# Patient Record
Sex: FEMALE | Race: OTHER | URBAN - METROPOLITAN AREA
[De-identification: names, ages, dates, MRNs, and addresses within clinical notes are randomized per-mention and may not be internally consistent; named-entity substitution may affect disease eponyms.]

---

## 2020-07-19 ENCOUNTER — EMERGENCY (EMERGENCY)
Facility: HOSPITAL | Age: 25
LOS: 1 days | Discharge: HOME | End: 2020-07-19
Admitting: EMERGENCY MEDICINE
Payer: COMMERCIAL

## 2020-07-19 VITALS
SYSTOLIC BLOOD PRESSURE: 148 MMHG | WEIGHT: 125 LBS | DIASTOLIC BLOOD PRESSURE: 89 MMHG | HEART RATE: 104 BPM | TEMPERATURE: 98 F | OXYGEN SATURATION: 98 % | HEIGHT: 65 IN | RESPIRATION RATE: 16 BRPM

## 2020-07-19 VITALS
DIASTOLIC BLOOD PRESSURE: 78 MMHG | OXYGEN SATURATION: 98 % | TEMPERATURE: 98 F | RESPIRATION RATE: 16 BRPM | HEART RATE: 87 BPM | SYSTOLIC BLOOD PRESSURE: 134 MMHG

## 2020-07-19 PROCEDURE — 99283 EMERGENCY DEPT VISIT LOW MDM: CPT | Mod: 25

## 2020-07-19 PROCEDURE — 69200 CLEAR OUTER EAR CANAL: CPT

## 2020-07-19 RX ORDER — IBUPROFEN 200 MG
1 TABLET ORAL
Qty: 20 | Refills: 0
Start: 2020-07-19

## 2020-07-19 RX ORDER — BACITRACIN ZINC 500 UNIT/G
1 OINTMENT IN PACKET (EA) TOPICAL ONCE
Refills: 0 | Status: COMPLETED | OUTPATIENT
Start: 2020-07-19 | End: 2020-07-19

## 2020-07-19 RX ORDER — OXYCODONE AND ACETAMINOPHEN 5; 325 MG/1; MG/1
1 TABLET ORAL ONCE
Refills: 0 | Status: DISCONTINUED | OUTPATIENT
Start: 2020-07-19 | End: 2020-07-19

## 2020-07-19 RX ORDER — CIPROFLOXACIN LACTATE 400MG/40ML
500 VIAL (ML) INTRAVENOUS ONCE
Refills: 0 | Status: COMPLETED | OUTPATIENT
Start: 2020-07-19 | End: 2020-07-19

## 2020-07-19 RX ADMIN — OXYCODONE AND ACETAMINOPHEN 1 TABLET(S): 5; 325 TABLET ORAL at 02:43

## 2020-07-19 RX ADMIN — Medication 1 APPLICATION(S): at 03:30

## 2020-07-19 RX ADMIN — Medication 500 MILLIGRAM(S): at 03:19

## 2020-07-19 NOTE — ED PROCEDURE NOTE - CPROC ED SCALPEL SIZE1
piercing removed with sterile desirae clamps and subsequently milked and irrigated with purulent dc noted, no retained FB noted,

## 2020-07-19 NOTE — ED ADULT NURSE NOTE - CHPI ED NUR SYMPTOMS NEG
no tingling/no decreased eating/drinking/no chills/no vomiting/no nausea/no fever/no pain/no weakness/no dizziness

## 2020-07-19 NOTE — ED PROVIDER NOTE - PATIENT PORTAL LINK FT
You can access the FollowMyHealth Patient Portal offered by [facility] by registering at the following website: [email or web address]. By joining [employer]’s FollowMyHealth portal, you will also be able to view your health information using other applications (apps) compatible with our system.

## 2020-07-19 NOTE — ED PROVIDER NOTE - CLINICAL SUMMARY MEDICAL DECISION MAKING FREE TEXT BOX
pt p/w embedded L ear piercing w surrounding erythema and edema, failed attempt at removal at , now with worsening pain and redness, site cleansed and ear piercing removed at bedside under local anesthesia, irrigated and bacitracin applied, post procedure with intact hearing and normal TM, no internal ear involvement or systemic sx, will change abx to levaquin for perichondritis, encouraged prompt f/u with ENT, strict return precautions discussed, pt verbalized understanding

## 2020-07-19 NOTE — ED PROVIDER NOTE - OBJECTIVE STATEMENT
24 yo F with no known PMHx, has L ear piercings, noted increased redness and pain and seen at [facility] yesterday with failed attempt at removal, started on clindamycin (day 1/7) with worsening 26 yo F with no known PMHx, has L ear piercings, noted increased redness and pain x few days and seen at  yesterday with failed attempt at removal, started on clindamycin (day 1/7) with worsening sx, presenting today c/o pain and yellowish dc from site tonight.  Pt reports difficulty sleeping due to worsening pain, redness, and swelling to the external ear region. Unable to see the external portion of the piercing and reports yellowish dc with bleeding from site.  Denies fever, chills, tinnitus, change in hearing/gait/balance, dysequilibrium, HA, dizziness, cough, sore throat, d/c, bleeding, pruritus, N/V, SOB, CP, palpitations, focal weakness, and malaise.

## 2020-07-19 NOTE — ED PROVIDER NOTE - CARE PROVIDER_API CALL
Michael Prakash  Otolaryngology  [address] [place] [id number]  Phone: [phone]  Fax: [phone]  Follow Up Time:

## 2020-07-19 NOTE — ED ADULT NURSE NOTE - OBJECTIVE STATEMENT
c/o left post auricular pain  Piercing appreciated to left ear  dried crusted secretions noted to left ear

## 2020-07-19 NOTE — ED ADULT NURSE NOTE - NSIMPLEMENTINTERV_GEN_ALL_ED
Implemented All Universal Safety Interventions:  [place] to call system. Call bell, personal items and telephone within reach. Instruct patient to call for assistance. Room bathroom lighting operational. Non-slip footwear when patient is off stretcher. Physically safe environment: no spills, clutter or unnecessary equipment. Stretcher in lowest position, wheels locked, appropriate side rails in place.

## 2020-07-19 NOTE — ED PROVIDER NOTE - NSFOLLOWUPINSTRUCTIONS_ED_ALL_ED_FT
Perichondritis, Adult     Perichondritis is an infection of the outer ear (pinna) that is caused by bacteria. The pinna is shaped to direct sound into the inner ear. It gets its shape from a firm, elastic tissue (cartilage). The cartilage is protected by another tissue called the perichondrium, which is just under the skin of the pinna.  If you injure your ear, bacteria can get under the skin and infect the perichondrium. The infection can cause swelling and pain. A collection of pus (abscess) may form between the perichondrium and the cartilage. This separates the perichondrium and its blood supply from the cartilage of the ear. A severe or untreated infection can lead to a type of ear deformity called cauliflower ear.  What are the causes?  This condition is caused by bacteria that enters the perichondrium when the outer ear is injured. The injury often results from an upper ear piercing that goes through cartilage instead of the ear lobe. Other causes include:  Injury to the outer ear (trauma).Using needles in or on the ear to treat problems (acupuncture).Insect bite.Ear pimple or boil.Ear surgery.Ear burn.What increases the risk?  You are more likely to develop this condition if:  You have diabetes.You have a weak body defense system (immune system).What are the signs or symptoms?  Symptoms of this condition include:  Dull pain that gets worse.Increasing ear swelling.Warm, tender, red skin over the ear.Fever.Swollen lymph nodes near the ear.Pus draining from the ear.How is this diagnosed?  This condition may be diagnosed based on:  Your signs and symptoms, especially if you had a recent ear injury. A physical exam. The health care provider will check the ear for swelling, redness, or drainage.Lab tests. These may include testing a sample of pus to identify the type of bacteria that is causing the infection (culture).How is this treated?  This condition is usually treated with antibiotic medicine. You will be given antibiotics that work well against most bacteria that cause the condition. If your infection is severe, you may be given antibiotics through an IV in the hospital. Your antibiotics will be changed if culture results show that your symptoms are caused by a specific kind of bacteria.  Other treatments for this condition may include:  Using warm or cold compresses to help with pain and discomfort.Taking pain medicine.Removing pus and dead cartilage with a surgical knife (incision and drainage) or a needle (aspiration).Injecting antibiotics and anti-inflammatory medicine (corticosteroids) into the affected area.Follow these instructions at home:  Medicines     Take over-the-counter and prescription medicines only as told by your health care provider. Take your antibiotic medicine as told by your health care provider. Do not stop taking the antibiotic even if you start to feel better.Managing pain and swelling     If directed, put ice on the affected area.  Put ice in a plastic bag.Place a towel between your skin and the bag.Leave the ice on for 20 minutes, 2–3 times a day.If directed, apply heat to the affected area. Use the heat source that your health care provider recommends, such as a moist heat pack or a heating pad.  Place a towel between your skin and the heat source. Leave the heat on for 20–30 minutes. Remove the heat if your skin turns bright red. This is especially important if you are unable to feel pain, heat, or cold. You may have a greater risk of getting burned.General instructions     If you had incision and drainage done, follow instructions from your health care provider about how to take care of your incision. Make sure you:   Wash your hands with soap and water before and after you change your bandage (dressing). If soap and water are not available, use hand . Change your dressing as told by your health care provider. Check your incision area every day for more redness, swelling, pain, warmth, pus, or a bad smell.Do not take baths, swim, or use a hot tub until your health care provider approves. Clean the ear as told by your health care provider.Resume your usual activities as told by your health care provider. Ask your health care provider what activities are safe for you. Keep all follow-up visits as told by your health care provider. This is important.Contact a health care provider if you have:  New or increasing chills or fever.Any signs of infection that get worse or do not get better. These may include more redness or drainage.A severe headache.Get help right away if you:  Develop a sudden increase in symptoms such as pain, fever, and swelling.Summary  Perichondritis is a bacterial infection of your outer ear. If you injure your ear, bacteria can get under the skin and infect the perichondrium.The main symptoms of this condition are pain, swelling, and tender, red skin over the ear.This condition can be diagnosed by your signs and symptoms, especially after an ear injury.Perichondritis is usually treated with antibiotic medicine. Other treatments include removing pus and dead cartilage with a surgical knife (incision and drainage) or a needle (aspiration).Follow instructions about taking medicines, managing pain and swelling, and taking care of your incision if you had incision and drainage done.

## 2020-07-19 NOTE — ED PROCEDURE NOTE - PROCEDURE ADDITIONAL DETAILS
e
piercing removed with sterile desirae clamps and subsequently milked and irrigated with purulent dc noted, no retained FB noted, hearing intact, TM intact post procedure

## 2020-07-19 NOTE — ED PROCEDURE NOTE - GENERAL PROCEDURE DETAILS
2cc of plain 1% lidocaine injected behind the L ear towards mastoid process - aspirated with no blood prior to injection

## 2020-07-19 NOTE — ED ADULT TRIAGE NOTE - CHIEF COMPLAINT QUOTE
Pt with complaint of left ear pain, swelling and redness to her ear piercing site X 2-3 days.  Pt denies fevers or chills.

## 2020-07-19 NOTE — ED PROVIDER NOTE - PHYSICAL EXAMINATION
Vital Signs - nursing notes reviewed and confirmed  Gen - WDWN F, NAD, comfortable and non-toxic appearing, speaking in full sentences   Skin - warm, dry,   HEENT - AT/NC, PERRL, EOMI, no conjunctival injection, moist oral mucosa, TM intact b/l with good cone of lights, o/p clear with no erythema, edema, or exudate, uvula midline, airway patent, neck supple and NT, FROM, no JVD or carotid bruits b/l, no palpable nodes  CV - S1S2, R/R/R  Resp - respiration non-labored, CTAB, symmetric bs b/l, no r/r/w  GI - NABS, soft, ND, NT, no rebound or guarding, no CVAT b/l   MS - w/w/p, no c/c/e  Neuro - AxOx3, no focal neuro deficits, ambulatory without gait disturbance Vital Signs - nursing notes reviewed and confirmed  Gen - WDWN F, NAD, comfortable and non-toxic appearing, speaking in full sentences   Skin - warm, dry, no acute rash   HEENT - AT/NC, PERRL, EOMI, no conjunctival injection, moist oral mucosa, TM intact b/l with good cone of lights, +L external ear with erythema, edema and crusting with palpable FB embedded into the superior crux/helix region, no fluctuance or crepitus, no mastoid tenderness, o/p clear with no erythema, edema, or exudate, uvula midline, airway patent, neck supple and NT, FROM  CV - S1S2, R/R/R  Resp - respiration non-labored, CTAB, symmetric bs b/l, no r/r/w  GI - NABS, soft, ND, NT, no rebound or guarding, no CVAT b/l   MS - w/w/p, no c/c/e  Neuro - AxOx3, no focal neuro deficits, ambulatory without gait disturbance
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
## 2020-07-23 DIAGNOSIS — T16.2XXA FOREIGN BODY IN LEFT EAR, INITIAL ENCOUNTER: ICD-10-CM
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
## 2020-07-23 DIAGNOSIS — H92.02 OTALGIA, LEFT EAR: ICD-10-CM

## 2020-07-23 DIAGNOSIS — H61.002 UNSPECIFIED PERICHONDRITIS OF LEFT EXTERNAL EAR: ICD-10-CM

## 2020-07-23 PROBLEM — Z78.9 OTHER SPECIFIED HEALTH STATUS: Chronic | Status: ACTIVE | Noted: 2020-07-19

## 2020-07-24 ENCOUNTER — APPOINTMENT (OUTPATIENT)
Dept: OTOLARYNGOLOGY | Facility: CLINIC | Age: 25
End: 2020-07-24
Payer: COMMERCIAL

## 2020-07-24 VITALS
BODY MASS INDEX: 20.09 KG/M2 | HEART RATE: 81 BPM | SYSTOLIC BLOOD PRESSURE: 118 MMHG | HEIGHT: 66 IN | TEMPERATURE: 97.3 F | WEIGHT: 125 LBS | OXYGEN SATURATION: 98 % | DIASTOLIC BLOOD PRESSURE: 78 MMHG

## 2020-07-24 DIAGNOSIS — Z78.9 OTHER SPECIFIED HEALTH STATUS: ICD-10-CM

## 2020-07-24 DIAGNOSIS — Z83.3 FAMILY HISTORY OF DIABETES MELLITUS: ICD-10-CM

## 2020-07-24 DIAGNOSIS — E04.1 NONTOXIC SINGLE THYROID NODULE: ICD-10-CM

## 2020-07-24 DIAGNOSIS — H61.032 CHONDRITIS OF LEFT EXTERNAL EAR: ICD-10-CM

## 2020-07-24 DIAGNOSIS — H61.22 IMPACTED CERUMEN, LEFT EAR: ICD-10-CM

## 2020-07-24 PROBLEM — Z00.00 ENCOUNTER FOR PREVENTIVE HEALTH EXAMINATION: Status: ACTIVE | Noted: 2020-07-24

## 2020-07-24 PROCEDURE — 69210 REMOVE IMPACTED EAR WAX UNI: CPT

## 2020-07-24 PROCEDURE — 99204 OFFICE O/P NEW MOD 45 MIN: CPT | Mod: 25

## 2020-07-24 RX ORDER — IBUPROFEN 800 MG/1
TABLET, FILM COATED ORAL
Refills: 0 | Status: ACTIVE | COMMUNITY

## 2020-07-24 RX ORDER — LEVOFLOXACIN 750 MG/1
750 TABLET, FILM COATED ORAL
Refills: 0 | Status: ACTIVE | COMMUNITY

## 2020-07-24 NOTE — HISTORY OF PRESENT ILLNESS
[de-identified] : 4d ago was seen in the ED where a L auricle infection had been caused by a helix earring which was removed at the time; she was placed on Levaquin for chondritis with significant relief & no pain or redness now. Hearing is ok; no tinnitus. No drainage. \par Known large thyroid but has never had a workup; her GM had a goiter but no thyroid cancers in the famliy and no ionizing radiation exp. \par \par Covid-19 screening questions: \par   Sick contacts: no\par   Recent international travel: no\par   Shortness of breath: no\par   New or productive cough: no\par   Fevers/chills/night sweats: no\par   COVID-19 testing: neg swab and Ab 1 m/a

## 2020-07-24 NOTE — ASSESSMENT
[FreeTextEntry1] : Complete abx course & RTC immediately for any reversal in her improvement. \par Discussed the importance of qtip avoidance.\par Sono and RTC

## 2020-07-24 NOTE — PHYSICAL EXAM
[de-identified] : thyrmoegaly w/ a small R isthmus nodule [Binocular Microscopic Exam] : Binocular microscopic exam was performed [FreeTextEntry7] : mild redness & edema of the L upper helix w/ healing earring site [FreeTextEntry9] : obstructing cerumen removed with a loop [Normal] : no rashes

## 2020-07-27 LAB
ALBUMIN SERPL ELPH-MCNC: 4.9 G/DL
ALP BLD-CCNC: 46 U/L
ALT SERPL-CCNC: 9 U/L
ANION GAP SERPL CALC-SCNC: 17 MMOL/L
AST SERPL-CCNC: 17 U/L
BASOPHILS # BLD AUTO: 0.05 K/UL
BASOPHILS NFR BLD AUTO: 0.9 %
BILIRUB SERPL-MCNC: 0.3 MG/DL
BUN SERPL-MCNC: 12 MG/DL
CALCIUM SERPL-MCNC: 9.9 MG/DL
CHLORIDE SERPL-SCNC: 101 MMOL/L
CO2 SERPL-SCNC: 20 MMOL/L
CREAT SERPL-MCNC: 0.92 MG/DL
EOSINOPHIL # BLD AUTO: 0.11 K/UL
EOSINOPHIL NFR BLD AUTO: 2 %
GLUCOSE SERPL-MCNC: 84 MG/DL
HCT VFR BLD CALC: 46.1 %
HGB BLD-MCNC: 15.1 G/DL
IMM GRANULOCYTES NFR BLD AUTO: 0 %
LYMPHOCYTES # BLD AUTO: 2.27 K/UL
LYMPHOCYTES NFR BLD AUTO: 41.2 %
MAN DIFF?: NORMAL
MCHC RBC-ENTMCNC: 32.1 PG
MCHC RBC-ENTMCNC: 32.8 GM/DL
MCV RBC AUTO: 97.9 FL
MONOCYTES # BLD AUTO: 0.31 K/UL
MONOCYTES NFR BLD AUTO: 5.6 %
NEUTROPHILS # BLD AUTO: 2.77 K/UL
NEUTROPHILS NFR BLD AUTO: 50.3 %
PLATELET # BLD AUTO: 286 K/UL
POTASSIUM SERPL-SCNC: 4.6 MMOL/L
PROT SERPL-MCNC: 7.8 G/DL
RBC # BLD: 4.71 M/UL
RBC # FLD: 12.2 %
SARS-COV-2 IGG SERPL IA-ACNC: 4.51 AU/ML
SARS-COV-2 IGG SERPL QL IA: NEGATIVE
SODIUM SERPL-SCNC: 138 MMOL/L
T3FREE SERPL-MCNC: 2.85 PG/ML
T4 FREE SERPL-MCNC: 1.6 NG/DL
THYROGLOB AB SERPL-ACNC: 38 IU/ML
THYROPEROXIDASE AB SERPL IA-ACNC: 13.2 IU/ML
TSH SERPL-ACNC: 2.09 UIU/ML
WBC # FLD AUTO: 5.51 K/UL

## 2020-07-28 LAB — TSI ACT/NOR SER: <0.1 IU/L

## 2020-07-30 ENCOUNTER — OUTPATIENT (OUTPATIENT)
Dept: OUTPATIENT SERVICES | Facility: HOSPITAL | Age: 25
LOS: 1 days | End: 2020-07-30
Payer: COMMERCIAL

## 2020-07-30 ENCOUNTER — APPOINTMENT (OUTPATIENT)
Dept: ULTRASOUND IMAGING | Facility: HOSPITAL | Age: 25
End: 2020-07-30

## 2020-07-30 PROCEDURE — 76536 US EXAM OF HEAD AND NECK: CPT | Mod: 26

## 2020-07-30 PROCEDURE — 76536 US EXAM OF HEAD AND NECK: CPT

## 2021-07-02 ENCOUNTER — TRANSCRIPTION ENCOUNTER (OUTPATIENT)
Age: 26
End: 2021-07-02